# Patient Record
Sex: FEMALE
[De-identification: names, ages, dates, MRNs, and addresses within clinical notes are randomized per-mention and may not be internally consistent; named-entity substitution may affect disease eponyms.]

---

## 2017-09-19 ENCOUNTER — HOSPITAL ENCOUNTER (EMERGENCY)
Dept: HOSPITAL 31 - C.ER | Age: 52
Discharge: HOME | End: 2017-09-19
Payer: COMMERCIAL

## 2017-09-19 VITALS
RESPIRATION RATE: 18 BRPM | SYSTOLIC BLOOD PRESSURE: 132 MMHG | HEART RATE: 82 BPM | TEMPERATURE: 98.2 F | DIASTOLIC BLOOD PRESSURE: 76 MMHG

## 2017-09-19 VITALS — OXYGEN SATURATION: 98 %

## 2017-09-19 DIAGNOSIS — M54.32: Primary | ICD-10-CM

## 2017-09-19 PROCEDURE — 99283 EMERGENCY DEPT VISIT LOW MDM: CPT

## 2017-09-19 PROCEDURE — 96372 THER/PROPH/DIAG INJ SC/IM: CPT

## 2017-09-19 NOTE — C.PDOC
History Of Present Illness


52 year old female presents to the ED with complaints of lower back pain for 

three weeks that radiates down the left leg. Patient notes experiencing similar 

pain in the past and has not taken medication for pain. She denies trauma, 

weakness, numbness, fever, or urinary symptoms. 


Time Seen by Provider: 09/19/17 09:43


Chief Complaint (Nursing): Back Pain


History Per: Patient


History/Exam Limitations: no limitations


Onset/Duration Of Symptoms: Persistent


Current Symptoms Are (Timing): Still Present


Quality Of Discomfort: "Pain"


Previous Symptoms: Back Pain


Associated Symptoms: None


Recent travel outside of the [place]: No





Past Medical History


Reviewed: Historical Data, Nursing Documentation, Vital Signs


Vital Signs: 


 Last Vital Signs











Temp  98.2 F   09/19/17 10:06


 


Pulse  82   09/19/17 10:06


 


Resp  18   09/19/17 10:06


 


BP  132/76   09/19/17 10:06


 


Pulse Ox  98   09/19/17 11:40














- Medical History


PMH: Depression, HTN, Hypercholesterolemia


Family History: States: Other


Other Family History: Non-contributory.





- Social History


Hx Tobacco Use: No


Hx Alcohol Use: No


Hx Substance Use: No





- Immunization History


Hx Tetanus Toxoid Vaccination: No


Hx Influenza Vaccination: No


Hx Pneumococcal Vaccination: No





Review Of Systems


Except As Marked, All Systems Reviewed And Found Negative.


Constitutional: Negative for: Fever


Genitourinary: Negative for: Dysuria


Neurological: Negative for: Weakness, Numbness





Physical Exam





- Physical Exam


Appears: Non-toxic, No Acute Distress


Skin: Warm, Dry


Head: Atraumatic


Eye(s): bilateral: PERRL, EOMI


Oral Mucosa: Moist


Neck: Normal ROM, Supple


Back: Paraspinal Tenderness (paraspinous lumbar tenderness )


Extremity: Normal ROM, No Tenderness


Neurological/Psych: Oriented x3, Normal Cranial Nerves, No Cerebellar Signs, 

Normal Motor, Normal Sensation, Normal Reflexes (reflexes symmetrical )


Gait: Steady





ED Course And Treatment


O2 Sat by Pulse Oximetry: 98 (room air )


Progress Note: Patient was given Toradol.





Disposition





- Disposition


Disposition: HOME/ ROUTINE


Disposition Time: 10:45


Condition: STABLE


Additional Instructions: 


Please follow up with your doctor. Return to the ER for any worsening symptoms 

or for any other concerns. 


Prescriptions: 


Acetaminophen [Shake That Ache] 1,000 mg PO Q6H PRN #12 tablet


 PRN Reason: Pain, Moderate (4-7)


Cyclobenzaprine [Cyclobenzaprine HCl] 10 mg PO TID PRN #20 tab


 PRN Reason: Pain, Severe (8-10)


Naproxen [Naprosyn] 500 mg PO Q12H PRN #10 tablet


 PRN Reason: Pain, Moderate (4-7)


Instructions:  Acute Low Back Pain (ED)


Forms:  Gen Discharge Inst [place], [facility] (English), Work Excuse





- Clinical Impression


Clinical Impression: 


 Sciatica








- Scribe Statement


The provider has reviewed the documentation as recorded by the Alvaroibblanca Bell





All medical record entries made by the Alvaroibblanca were at my direction and 

personally dictated by me. I have reviewed the chart and agree that the record 

accurately reflects my personal performance of the history, physical exam, 

medical decision making, and the department course for this patient. I have 

also personally directed, reviewed, and agree with the discharge instructions 

and disposition.

## 2018-03-03 ENCOUNTER — HOSPITAL ENCOUNTER (EMERGENCY)
Dept: HOSPITAL 31 - C.ER | Age: 53
Discharge: HOME | End: 2018-03-03
Payer: COMMERCIAL

## 2018-03-03 VITALS — BODY MASS INDEX: 30.6 KG/M2

## 2018-03-03 VITALS — OXYGEN SATURATION: 98 %

## 2018-03-03 VITALS
SYSTOLIC BLOOD PRESSURE: 112 MMHG | TEMPERATURE: 98.1 F | DIASTOLIC BLOOD PRESSURE: 68 MMHG | HEART RATE: 69 BPM | RESPIRATION RATE: 20 BRPM

## 2018-03-03 DIAGNOSIS — E78.00: ICD-10-CM

## 2018-03-03 DIAGNOSIS — R10.9: Primary | ICD-10-CM

## 2018-03-03 DIAGNOSIS — I10: ICD-10-CM

## 2018-03-03 LAB
ALBUMIN SERPL-MCNC: 4.1 G/DL (ref 3.5–5)
ALBUMIN/GLOB SERPL: 1.2 {RATIO} (ref 1–2.1)
ALT SERPL-CCNC: 46 U/L (ref 9–52)
AST SERPL-CCNC: 44 U/L (ref 14–36)
BASOPHILS # BLD AUTO: 0.1 K/UL (ref 0–0.2)
BASOPHILS NFR BLD: 0.9 % (ref 0–2)
BILIRUB UR-MCNC: NEGATIVE MG/DL
BUN SERPL-MCNC: 20 MG/DL (ref 7–17)
CALCIUM SERPL-MCNC: 9.1 MG/DL (ref 8.6–10.4)
EOSINOPHIL # BLD AUTO: 0.1 K/UL (ref 0–0.7)
EOSINOPHIL NFR BLD: 0.8 % (ref 0–4)
ERYTHROCYTE [DISTWIDTH] IN BLOOD BY AUTOMATED COUNT: 13.5 % (ref 11.5–14.5)
GFR NON-AFRICAN AMERICAN: > 60
GLUCOSE UR STRIP-MCNC: NORMAL MG/DL
HGB BLD-MCNC: 13.1 G/DL (ref 11–16)
LEUKOCYTE ESTERASE UR-ACNC: (no result) LEU/UL
LIPASE: 169 U/L (ref 23–300)
LYMPHOCYTES # BLD AUTO: 2.7 K/UL (ref 1–4.3)
LYMPHOCYTES NFR BLD AUTO: 38.1 % (ref 20–40)
MCH RBC QN AUTO: 31.1 PG (ref 27–31)
MCHC RBC AUTO-ENTMCNC: 35 G/DL (ref 33–37)
MCV RBC AUTO: 88.9 FL (ref 81–99)
MONOCYTES # BLD: 0.5 K/UL (ref 0–0.8)
MONOCYTES NFR BLD: 6.6 % (ref 0–10)
NEUTROPHILS # BLD: 3.8 K/UL (ref 1.8–7)
NEUTROPHILS NFR BLD AUTO: 53.6 % (ref 50–75)
NRBC BLD AUTO-RTO: 0.1 % (ref 0–2)
PH UR STRIP: 7 [PH] (ref 5–8)
PLATELET # BLD: 264 K/UL (ref 130–400)
PMV BLD AUTO: 9.2 FL (ref 7.2–11.7)
PROT UR STRIP-MCNC: NEGATIVE MG/DL
RBC # BLD AUTO: 4.22 MIL/UL (ref 3.8–5.2)
RBC # UR STRIP: NEGATIVE /UL
SP GR UR STRIP: 1.02 (ref 1–1.03)
SQUAMOUS EPITHIAL: 2 /HPF (ref 0–5)
URINE NITRATE: NEGATIVE
UROBILINOGEN UR-MCNC: NORMAL MG/DL (ref 0.2–1)
WBC # BLD AUTO: 7.2 K/UL (ref 4.8–10.8)

## 2018-03-03 PROCEDURE — 96361 HYDRATE IV INFUSION ADD-ON: CPT

## 2018-03-03 PROCEDURE — 99284 EMERGENCY DEPT VISIT MOD MDM: CPT

## 2018-03-03 PROCEDURE — 80053 COMPREHEN METABOLIC PANEL: CPT

## 2018-03-03 PROCEDURE — 74022 RADEX COMPL AQT ABD SERIES: CPT

## 2018-03-03 PROCEDURE — 85025 COMPLETE CBC W/AUTO DIFF WBC: CPT

## 2018-03-03 PROCEDURE — 83690 ASSAY OF LIPASE: CPT

## 2018-03-03 PROCEDURE — 81001 URINALYSIS AUTO W/SCOPE: CPT

## 2018-03-03 PROCEDURE — 96375 TX/PRO/DX INJ NEW DRUG ADDON: CPT

## 2018-03-03 PROCEDURE — 96374 THER/PROPH/DIAG INJ IV PUSH: CPT

## 2018-03-03 PROCEDURE — 84484 ASSAY OF TROPONIN QUANT: CPT

## 2018-03-03 NOTE — C.PDOC
History Of Present Illness





52 year old female with a past medical history of gastritis who presents to the 

emergency department with a complaint of an abdominal pain associated with 

nausea, constipation, burning sensation with urination, and generalized 

weakness that started on 03/01/2018. Reports she did not take medications for 

the discomfort. Denies shortness of breath, chest pain, or diarrhea. 


Time Seen by Provider: 03/03/18 08:20


Chief Complaint (Nursing): Abdominal Pain


History Per: Patient


History/Exam Limitations: no limitations


Onset/Duration Of Symptoms: Days





Past Medical History


Reviewed: Historical Data, Nursing Documentation, Vital Signs


Vital Signs: 


 Last Vital Signs











Temp  98.1 F   03/03/18 10:28


 


Pulse  69   03/03/18 10:28


 


Resp  20   03/03/18 10:28


 


BP  112/68   03/03/18 10:28


 


Pulse Ox  98   03/03/18 18:21














- Medical History


PMH: Depression, HTN, Hypercholesterolemia


Surgical History: No Surg Hx


Family History: States: Unknown Family Hx





- Social History


Hx Tobacco Use: No


Hx Alcohol Use: No


Hx Substance Use: No





- Immunization History


Hx Tetanus Toxoid Vaccination: No


Hx Influenza Vaccination: No


Hx Pneumococcal Vaccination: No





Review Of Systems


Except As Marked, All Systems Reviewed And Found Negative. (As per HPI, 

otherwise negative)


Constitutional: Positive for: Weakness (generalized)


Cardiovascular: Negative for: Chest Pain


Respiratory: Negative for: Shortness of Breath


Gastrointestinal: Positive for: Nausea, Abdominal Pain, Constipation.  Negative 

for: Diarrhea


Genitourinary: Positive for: Dysuria





Physical Exam





- Physical Exam


Appears: Well, Non-toxic, Toxic


Skin: Normal Color, Warm, Dry


Neck: Normal


Chest: Symmetrical


Cardiovascular: Rhythm Regular


Respiratory: Normal Breath Sounds, No Decreased Breath Sounds, No Accessory 

Muscle Use


Gastrointestinal/Abdominal: No Normal Exam, Bowel Sounds (Present), Soft, 

Tenderness (Epigastric), No Guarding, No Rebound


Back: Normal Inspection


Extremity: Normal ROM


Neurological/Psych: Oriented x3


Gait: Steady





ED Course And Treatment





- Laboratory Results


Result Diagrams: 


 03/03/18 09:11





 03/03/18 09:11


O2 Sat by Pulse Oximetry: 98 (RA)


Pulse Ox Interpretation: Normal





Medical Decision Making


Medical Decision Making: 





Time:0842


--CMP


--Lipase


--Troponin I


--CBC w/ diff


--Toradol 30 mg IVP


--Zofran 4 mg IVP


--Protonix 40 mg IVP


--Sodium Chloride 1L IV


--Urinalysis


--Obstructive Series


--Reevaluation 





Time: 0914


--Obstructive Series


FINDINGS:





CHEST:


Lungs: Clear.





Cardiovascular: Normal size heart. No pulmonary vascular congestion.





Pleura: No pleural fluid. No pneumothorax.





Other findings: None.





ABDOMEN AND PELVIS:


Bowel: Unremarkable bowel gas pattern. No evidence of mechanical obstruction.





Free air: None.





Bones:  Unremarkable.





Other findings: None.





IMPRESSION:


Unremarkable radiographs of chest and abdomen. No evidence of mechanical bowel 

obstruction.








Time: 1021


--Patient will be discharge home with an Rx for Nexium 40 mg and Zofran 4 mg. 

Follow up with PMD within 2 days. Patient states improvement in symptoms at 

this time 


Clinical Impression:  Gastritis 





Disposition


Counseled Patient/Family Regarding: Studies Performed, Diagnosis, Need For 

Followup, Rx Given





- Disposition


Referrals: 


Lashaun Alonso MD [Medical Doctor] - 


Disposition: HOME/ ROUTINE


Disposition Time: 10:21


Condition: STABLE


Additional Instructions: 


follow up with your doctor or medical clinic in 2 days


call to make an appointment


take medication as prescribed


return to ER if symptoms worsens or progress


Prescriptions: 


Esomeprazole Magnesium [Nexium] 40 mg PO DAILY #15 capsule.


Ondansetron ODT [Zofran ODT] 4 mg PO TID PRN #12 odt


 PRN Reason: Nausea/Vomiting


Instructions:  Gastritis, Acute Abdomen (Belly Pain), Adult (DC)


Forms:  Gen Discharge Inst [place], [facility] ([place])


Print Language: [place]ian





- Clinical Impression


Clinical Impression: 


 Abdominal pain








- Scribe Statement





Molly

## 2018-03-03 NOTE — RAD
PROCEDURE:  Radiographs of the chest and abdomen (obstructive series)



HISTORY:

abd pain  



COMPARISON:

Chest radiograph dated 03/02/2017 ; no prior relevant abdominal 

imaging.



TECHNIQUE:

AP radiograph of the chest, with upright and supine radiographs of 

the abdomen.



FINDINGS:



CHEST:

Lungs: Clear.



Cardiovascular: Normal size heart. No pulmonary vascular congestion.



Pleura: No pleural fluid. No pneumothorax.



Other findings: None.



ABDOMEN AND PELVIS:

Bowel: Unremarkable bowel gas pattern. No evidence of mechanical 

obstruction.



Free air: None.



Bones:  Unremarkable.



Other findings: None.



IMPRESSION:

Unremarkable radiographs of chest and abdomen. No evidence of 

mechanical bowel obstruction.